# Patient Record
Sex: FEMALE | Race: WHITE | NOT HISPANIC OR LATINO | URBAN - METROPOLITAN AREA
[De-identification: names, ages, dates, MRNs, and addresses within clinical notes are randomized per-mention and may not be internally consistent; named-entity substitution may affect disease eponyms.]

---

## 2023-10-02 NOTE — ASU PATIENT PROFILE, ADULT - FALL HARM RISK - UNIVERSAL INTERVENTIONS
Bed in lowest position, wheels locked, appropriate side rails in place/Call bell, personal items and telephone in reach/Instruct patient to call for assistance before getting out of bed or chair/Non-slip footwear when patient is out of bed/[place] to call system/Physically safe environment - no spills, clutter or unnecessary equipment/Purposeful Proactive Rounding/Room/bathroom lighting operational, light cord in reach

## 2023-10-02 NOTE — ASU PATIENT PROFILE, ADULT - ABLE TO REACH PT
Left voicemail message with instruction and time; patient instructed to arrive at 11:00am; No solid food/daily/candy/gum after midnight; water allowed before 10:00am tomorrow; patient to come with photo ID/Insurance/credit card; dress comfortable; no jewelries/contact lens/valuables; no smoking/alcohol drinking/recreational drug use tonight; escort to have photo ID; address and callback number was given./no

## 2023-10-03 ENCOUNTER — OUTPATIENT (OUTPATIENT)
Dept: OUTPATIENT SERVICES | Facility: HOSPITAL | Age: 28
LOS: 1 days | Discharge: HOME | End: 2023-10-03

## 2023-10-03 VITALS
HEIGHT: 65 IN | HEART RATE: 62 BPM | OXYGEN SATURATION: 99 % | SYSTOLIC BLOOD PRESSURE: 117 MMHG | RESPIRATION RATE: 16 BRPM | DIASTOLIC BLOOD PRESSURE: 68 MMHG | WEIGHT: 156.53 LBS | TEMPERATURE: 98 F

## 2023-10-03 VITALS
DIASTOLIC BLOOD PRESSURE: 71 MMHG | HEART RATE: 80 BPM | OXYGEN SATURATION: 97 % | RESPIRATION RATE: 17 BRPM | SYSTOLIC BLOOD PRESSURE: 108 MMHG

## 2023-10-03 DEVICE — SYS CATH BALLOON RELIEVA SINUS: Type: IMPLANTABLE DEVICE | Status: FUNCTIONAL

## 2023-10-03 RX ORDER — SODIUM CHLORIDE 9 MG/ML
500 INJECTION, SOLUTION INTRAVENOUS
Refills: 0 | Status: DISCONTINUED | OUTPATIENT
Start: 2023-10-03 | End: 2023-10-03

## 2023-10-03 RX ORDER — FENTANYL CITRATE 50 UG/ML
25 INJECTION INTRAVENOUS
Refills: 0 | Status: DISCONTINUED | OUTPATIENT
Start: 2023-10-03 | End: 2023-10-03

## 2023-10-03 RX ORDER — APREPITANT 80 MG/1
40 CAPSULE ORAL ONCE
Refills: 0 | Status: COMPLETED | OUTPATIENT
Start: 2023-10-03 | End: 2023-10-03

## 2023-10-03 RX ORDER — DIAZEPAM 5 MG
2.5 TABLET ORAL ONCE
Refills: 0 | Status: DISCONTINUED | OUTPATIENT
Start: 2023-10-03 | End: 2023-10-03

## 2023-10-03 RX ORDER — ACETAMINOPHEN 500 MG
1000 TABLET ORAL ONCE
Refills: 0 | Status: COMPLETED | OUTPATIENT
Start: 2023-10-03 | End: 2023-10-03

## 2023-10-03 RX ADMIN — APREPITANT 40 MILLIGRAM(S): 80 CAPSULE ORAL at 11:55

## 2023-10-03 RX ADMIN — Medication 2.5 MILLIGRAM(S): at 15:05

## 2023-10-03 RX ADMIN — Medication 1000 MILLIGRAM(S): at 11:55

## 2023-10-03 NOTE — BRIEF OPERATIVE NOTE - NSICDXBRIEFPROCEDURE_GEN_ALL_CORE_FT
PROCEDURES:  Endoscopic maxillary antrostomy 03-Oct-2023 14:42:54  Heavenly Bustamante  Coblation of nasal turbinate 03-Oct-2023 14:43:14  Heavenly Bustamante  Surgical fracture, inferior nasal turbinate 03-Oct-2023 14:43:19  Heavenly Bustamante

## 2023-10-03 NOTE — ASU PREOP CHECKLIST - VIA
Pt is on home oxygen only while sleeping. Pt's oxygen dropped to 85% BLAIRE Sorenson RN on 6/6/2023 at 2:03 AM     ambulate

## 2023-10-03 NOTE — BRIEF OPERATIVE NOTE - NSICDXBRIEFPREOP_GEN_ALL_CORE_FT
PRE-OP DIAGNOSIS:  Chronic maxillary sinusitis 03-Oct-2023 14:43:40  Heavenly Bustamante  Nasal turbinate hypertrophy 03-Oct-2023 14:43:48  Heavenly Bustamante

## 2023-12-18 NOTE — ASU PREOP CHECKLIST - IV STARTED
no Muna Mckinley MD  [facility] Division of Hospital Medicine  Pager [phone] (M-F 8AM-5PM)  Other Times: [phone]    Patient is a 71y old  Male who presents with a chief complaint of Diffuse Spinal Mets from Prostate Cancer (17 Dec 2023 09:53)    SUBJECTIVE / OVERNIGHT EVENTS: no acute events overnight     MEDICATIONS  (STANDING):  atorvastatin 20 milliGRAM(s) Oral at bedtime  atovaquone  Suspension 750 milliGRAM(s) Oral two times a day  bicalutamide 50 milliGRAM(s) Oral daily  chlorhexidine 2% Cloths 1 Application(s) Topical daily  cholecalciferol 2000 Unit(s) Oral daily  dextrose 5%. 1000 milliLiter(s) (50 mL/Hr) IV Continuous <Continuous>  dextrose 5%. 1000 milliLiter(s) (100 mL/Hr) IV Continuous <Continuous>  dextrose 50% Injectable 25 Gram(s) IV Push once  dextrose 50% Injectable 12.5 Gram(s) IV Push once  dextrose 50% Injectable 25 Gram(s) IV Push once  glucagon  Injectable 1 milliGRAM(s) IntraMuscular once  heparin   Injectable 5000 Unit(s) SubCutaneous every 8 hours  lactobacillus acidophilus 1 Tablet(s) Oral daily  lisinopril 5 milliGRAM(s) Oral daily  nafcillin  IVPB 2 Gram(s) IV Intermittent every 4 hours  pantoprazole  Injectable 40 milliGRAM(s) IV Push daily  tamsulosin 0.4 milliGRAM(s) Oral at bedtime    MEDICATIONS  (PRN):  benzocaine/menthol Lozenge 1 Lozenge Oral every 6 hours PRN Sore Throat  dextrose Oral Gel 15 Gram(s) Oral once PRN Blood Glucose LESS THAN 70 milliGRAM(s)/deciliter  guaiFENesin Oral Liquid (Sugar-Free) 200 milliGRAM(s) Oral every 6 hours PRN Cough  levalbuterol Inhalation 0.63 milliGRAM(s) Inhalation every 6 hours PRN shortness of breath/wheezing  naloxone Injectable 0.1 milliGRAM(s) IV Push every 3 minutes PRN For ANY of the following changes in patient status:  A. RR LESS THAN 10 breaths per minute, B. Oxygen saturation LESS THAN 90%, C. Sedation score of 6  ondansetron Injectable 4 milliGRAM(s) IV Push every 6 hours PRN Nausea      PHYSICAL EXAM:  Vital Signs Last 24 Hrs  T(C): 36.7 (18 Dec 2023 06:31), Max: 36.7 (17 Dec 2023 20:00)  T(F): 98 (18 Dec 2023 06:31), Max: 98 (17 Dec 2023 20:00)  HR: 80 (18 Dec 2023 06:31) (80 - 88)  BP: 116/74 (18 Dec 2023 06:31) (116/74 - 120/74)  BP(mean): --  RR: 18 (18 Dec 2023 06:31) (18 - 18)  SpO2: 98% (18 Dec 2023 06:31) (98% - 98%)    Parameters below as of 18 Dec 2023 06:31  Patient On (Oxygen Delivery Method): room air        CONSTITUTIONAL: NAD, well-developed, well-groomed  RESPIRATORY: Normal respiratory effort; lungs are clear to auscultation bilaterally  CARDIOVASCULAR: Regular rate and rhythm, normal S1 and S2, no murmur/rub/gallop; No lower extremity edema  GASTROINTESTINAL: Nontender to palpation, normoactive bowel sounds, no rebound/guarding; No hepatosplenomegaly  MUSCULOSKELETAL:  no clubbing or cyanosis of digits; no joint swelling or tenderness to palpation  NEUROLOGY: non-focal; no gross sensory deficits   PSYCH: A+O to person, place, and time; affect appropriate  SKIN: No rashes; warm     LABS:                        9.8    5.61  )-----------( 245      ( 18 Dec 2023 02:44 )             30.3     12-18    138  |  102  |  28<H>  ----------------------------<  107<H>  4.3   |  20<L>  |  1.07    Ca    9.7      18 Dec 2023 02:44  Phos  3.7     12-18  Mg     2.00     12-18            Urinalysis Basic - ( 18 Dec 2023 02:44 )    Color: x / Appearance: x / SG: x / pH: x  Gluc: 107 mg/dL / Ketone: x  / Bili: x / Urobili: x   Blood: x / Protein: x / Nitrite: x   Leuk Esterase: x / RBC: x / WBC x   Sq Epi: x / Non Sq Epi: x / Bacteria: x          RADIOLOGY & ADDITIONAL TESTS:  Results Reviewed:   Imaging Personally Reviewed:  Electrocardiogram Personally Reviewed:    COORDINATION OF CARE:  Care Discussed with Consultants/Other Providers [Y/N]:  Prior or Outpatient Records Reviewed [Y/N]:   Muna Mckinley MD  [facility] Division of Hospital Medicine  Pager [phone] (M-F 8AM-5PM)  Other Times: [phone]    Patient is a 71y old  Male who presents with a chief complaint of Diffuse Spinal Mets from Prostate Cancer (17 Dec 2023 09:53)    SUBJECTIVE / OVERNIGHT EVENTS: no acute events overnight     MEDICATIONS  (STANDING):  atorvastatin 20 milliGRAM(s) Oral at bedtime  atovaquone  Suspension 750 milliGRAM(s) Oral two times a day  bicalutamide 50 milliGRAM(s) Oral daily  chlorhexidine 2% Cloths 1 Application(s) Topical daily  cholecalciferol 2000 Unit(s) Oral daily  dextrose 5%. 1000 milliLiter(s) (50 mL/Hr) IV Continuous <Continuous>  dextrose 5%. 1000 milliLiter(s) (100 mL/Hr) IV Continuous <Continuous>  dextrose 50% Injectable 25 Gram(s) IV Push once  dextrose 50% Injectable 12.5 Gram(s) IV Push once  dextrose 50% Injectable 25 Gram(s) IV Push once  glucagon  Injectable 1 milliGRAM(s) IntraMuscular once  heparin   Injectable 5000 Unit(s) SubCutaneous every 8 hours  lactobacillus acidophilus 1 Tablet(s) Oral daily  lisinopril 5 milliGRAM(s) Oral daily  nafcillin  IVPB 2 Gram(s) IV Intermittent every 4 hours  pantoprazole  Injectable 40 milliGRAM(s) IV Push daily  tamsulosin 0.4 milliGRAM(s) Oral at bedtime    MEDICATIONS  (PRN):  benzocaine/menthol Lozenge 1 Lozenge Oral every 6 hours PRN Sore Throat  dextrose Oral Gel 15 Gram(s) Oral once PRN Blood Glucose LESS THAN 70 milliGRAM(s)/deciliter  guaiFENesin Oral Liquid (Sugar-Free) 200 milliGRAM(s) Oral every 6 hours PRN Cough  levalbuterol Inhalation 0.63 milliGRAM(s) Inhalation every 6 hours PRN shortness of breath/wheezing  naloxone Injectable 0.1 milliGRAM(s) IV Push every 3 minutes PRN For ANY of the following changes in patient status:  A. RR LESS THAN 10 breaths per minute, B. Oxygen saturation LESS THAN 90%, C. Sedation score of 6  ondansetron Injectable 4 milliGRAM(s) IV Push every 6 hours PRN Nausea      PHYSICAL EXAM:  Vital Signs Last 24 Hrs  T(C): 36.7 (18 Dec 2023 06:31), Max: 36.7 (17 Dec 2023 20:00)  T(F): 98 (18 Dec 2023 06:31), Max: 98 (17 Dec 2023 20:00)  HR: 80 (18 Dec 2023 06:31) (80 - 88)  BP: 116/74 (18 Dec 2023 06:31) (116/74 - 120/74)  BP(mean): --  RR: 18 (18 Dec 2023 06:31) (18 - 18)  SpO2: 98% (18 Dec 2023 06:31) (98% - 98%)    Parameters below as of 18 Dec 2023 06:31  Patient On (Oxygen Delivery Method): room air        CONSTITUTIONAL: NAD, well-developed, well-groomed  RESPIRATORY: Normal respiratory effort; lungs are clear to auscultation bilaterally  CARDIOVASCULAR: Regular rate and rhythm, normal S1 and S2, no murmur/rub/gallop; No lower extremity edema  GASTROINTESTINAL: Nontender to palpation, normoactive bowel sounds, no rebound/guarding; No hepatosplenomegaly  MUSCULOSKELETAL:  no clubbing or cyanosis of digits; no joint swelling or tenderness to palpation  NEUROLOGY: non-focal; no gross sensory deficits   PSYCH: A+O to person, place, and time; affect appropriate  SKIN: No rashes; warm     LABS:                        9.8    5.61  )-----------( 245      ( 18 Dec 2023 02:44 )             30.3     12-18    138  |  102  |  28<H>  ----------------------------<  107<H>  4.3   |  20<L>  |  1.07    Ca    9.7      18 Dec 2023 02:44  Phos  3.7     12-18  Mg     2.00     12-18            Urinalysis Basic - ( 18 Dec 2023 02:44 )    Color: x / Appearance: x / SG: x / pH: x  Gluc: 107 mg/dL / Ketone: x  / Bili: x / Urobili: x   Blood: x / Protein: x / Nitrite: x   Leuk Esterase: x / RBC: x / WBC x   Sq Epi: x / Non Sq Epi: x / Bacteria: x          RADIOLOGY & ADDITIONAL TESTS:  Results Reviewed:   Imaging Personally Reviewed:  Electrocardiogram Personally Reviewed:    COORDINATION OF CARE:  Care Discussed with Consultants/Other Providers [Y/N]:  Prior or Outpatient Records Reviewed [Y/N]:

## 2024-09-04 NOTE — BRIEF OPERATIVE NOTE - TYPE OF ANESTHESIA
General
Physical Therapy Visit    Visit Type: Daily Treatment Note  Visit: 4  Referring Provider: Kayli Sarabia CNP  Medical Diagnosis (from order): M17.9 - Osteoarthritis of knee, unspecified laterality, unspecified osteoarthritis type   Patient alert and oriented X3.    SUBJECTIVE                                                                                                               It hurts so bad. Travelled to [place], sitting in plane etc. Pain 6-7/10      OBJECTIVE                                                                                                                                      Treatment     Therapeutic Exercise  Pt was instructed in the following exercises with proper format to progressively improve posture, muscle/joint flexibility to improve range of motion/strength and function, endurance, to decrease the pain and progress towards goals, and maximize independence with home exercise program.  Exercises :  Nu step level 2, 3 minutes, rest 3 minutes again.  Proprioceptive Neuromuscular facilitation strengthening each lower extremity for mass flexion and extension patterns with graded manual resistance 10 x 2    - Assistance needed: stand by assist for instruction and safety  - Cues: verbal and tactile cueing for correct muscle activation and form  - Corrected: posture  - Equipment used: as above  - Quality of movement/task: completed with increased time             Neuromuscular Re-Education  Pt was instructed in the following ex with proper format to progressively improve NM recruitment practicing correct posture, proper body mechanics, proprioception maximizing ROM, flexibility, ms strength, endurance, to decrease the pain and maximize all functional mobilty, towards achieving the set goals:   Updated exercises as in the HEP section below--performed and education reviewed with the physical therapy and her accompanied family, handouts issued/explained.  Kept the exercises simple so the 
patient is more compliant to perform.      Skilled input: verbal instruction/cues, tactile instruction/cues, demonstration, posture correction, as detailed above and facilitation    Home Exercise Program  Access Code: [email or web address]  URL: [email or web address]  Date: 09/04/2024  Prepared by: Charsi Bond    Exercises  - Supine Heel Slide  - 2 x daily - 7 x weekly - 3 sets - 10 reps  - Long Sitting Quad Set with Towel Roll Under Heel  - 2 x daily - 7 x weekly - 3 sets - 10 reps - 6 hold  - Seated Long Arc Quad  - 2 x daily - 15 reps  - Standing Hip Abduction with Counter Support  - 1-2 x daily - 1 sets - 10 reps  - Supine Hip Abduction  - 2 x daily - 1-2 sets - 10 reps  - Supine Knee Extension Strengthening  - 3 x daily - 10 reps - 10 hold  - Sit to Stand  - 5-10 reps      ASSESSMENT                                                                                                            Kept the exercises simple so the patient is more compliant to perform.  Patient is well motivated, family supportive.      Pain/symptoms after session (out of 10): 3  Education:   - Results of above outlined education: Verbalizes understanding, Demonstrates understanding and Needs reinforcement    PLAN                                                                                                                           Suggestions for next session as indicated: Progress per plan of care focusing progressive strengthening left knee/lower extremities, minimizing the pain, for steadier and easier functional mobility.       Therapy procedure time and total treatment time can be found documented on the Time Entry flowsheet    
ADMIT

## (undated) DEVICE — VENODYNE/SCD SLEEVE CALF MEDIUM

## (undated) DEVICE — SUT PLAIN GUT 4-0 18" SC-1

## (undated) DEVICE — DRAPE MAYO STAND 23"

## (undated) DEVICE — DRSG TELFA 3 X 8

## (undated) DEVICE — DRAPE TOWEL BLUE 17" X 24"

## (undated) DEVICE — SUT PDS II PLUS 5-0 18" P-3 UNDYED

## (undated) DEVICE — PETRI DISH MED 3.5"

## (undated) DEVICE — S&N ARTHROCARE ENT WAND REFLEX ULTRA PTR

## (undated) DEVICE — SUT PDS II 4-0 18" P-3

## (undated) DEVICE — SYR LUER LOK 50CC

## (undated) DEVICE — PACK RHINOPLASTY

## (undated) DEVICE — ACCLARENT SET INFLATION DEVICE

## (undated) DEVICE — ELCTR BOVIE SUCTION 8FR 6"

## (undated) DEVICE — SUT ETHILON 4-0 18" P-3 UNDYED

## (undated) DEVICE — SOL ANTI FOG

## (undated) DEVICE — SUT MONOCRYL 5-0 18" P-3 UNDYED

## (undated) DEVICE — WARMING BLANKET LOWER ADULT

## (undated) DEVICE — SUT VICRYL 6-0 18" P-3 UNDYED

## (undated) DEVICE — DRSG GAUZE SPONGE 2X2" STERILE

## (undated) DEVICE — GLV 6.5 PROTEXIS (WHITE)

## (undated) DEVICE — TUBING SUCTION NONCONDUCTIVE 6MM X 12FT